# Patient Record
Sex: FEMALE | Race: WHITE | HISPANIC OR LATINO | Employment: STUDENT | URBAN - METROPOLITAN AREA
[De-identification: names, ages, dates, MRNs, and addresses within clinical notes are randomized per-mention and may not be internally consistent; named-entity substitution may affect disease eponyms.]

---
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
## 2023-01-22 ENCOUNTER — OFFICE VISIT (OUTPATIENT)
Dept: URGENT CARE | Facility: URGENT CARE | Age: 18
End: 2023-01-22
Payer: COMMERCIAL

## 2023-01-22 VITALS
HEIGHT: 66 IN | HEART RATE: 116 BPM | BODY MASS INDEX: 35.9 KG/M2 | OXYGEN SATURATION: 99 % | RESPIRATION RATE: 16 BRPM | WEIGHT: 223.4 LBS | TEMPERATURE: 97.3 F | SYSTOLIC BLOOD PRESSURE: 152 MMHG | DIASTOLIC BLOOD PRESSURE: 88 MMHG

## 2023-01-22 DIAGNOSIS — K62.5 RECTAL BLEEDING: Primary | ICD-10-CM

## 2023-01-22 LAB
ALBUMIN SERPL-MCNC: 3.7 G/DL (ref 3.4–5)
ALP SERPL-CCNC: 79 U/L (ref 40–150)
ALT SERPL W P-5'-P-CCNC: 31 U/L (ref 0–50)
ANION GAP SERPL CALCULATED.3IONS-SCNC: 6 MMOL/L (ref 3–14)
AST SERPL W P-5'-P-CCNC: 25 U/L (ref 0–35)
BASOPHILS # BLD AUTO: 0 10E3/UL (ref 0–0.2)
BASOPHILS NFR BLD AUTO: 1 %
BILIRUB SERPL-MCNC: 0.5 MG/DL (ref 0.2–1.3)
BUN SERPL-MCNC: 12 MG/DL (ref 7–30)
CALCIUM SERPL-MCNC: 9.8 MG/DL (ref 8.5–10.1)
CHLORIDE BLD-SCNC: 104 MMOL/L (ref 96–110)
CO2 SERPL-SCNC: 30 MMOL/L (ref 20–32)
CREAT SERPL-MCNC: 0.6 MG/DL (ref 0.5–1)
EOSINOPHIL # BLD AUTO: 0.2 10E3/UL (ref 0–0.7)
EOSINOPHIL NFR BLD AUTO: 3 %
ERYTHROCYTE [DISTWIDTH] IN BLOOD BY AUTOMATED COUNT: 11.8 % (ref 10–15)
GFR SERPL CREATININE-BSD FRML MDRD: >90 ML/MIN/1.73M2
GLUCOSE BLD-MCNC: 123 MG/DL (ref 70–99)
HCT VFR BLD AUTO: 41.1 % (ref 35–47)
HGB BLD-MCNC: 13.6 G/DL (ref 11.7–15.7)
LYMPHOCYTES # BLD AUTO: 1.9 10E3/UL (ref 0.8–5.3)
LYMPHOCYTES NFR BLD AUTO: 28 %
MCH RBC QN AUTO: 30.6 PG (ref 26.5–33)
MCHC RBC AUTO-ENTMCNC: 33.1 G/DL (ref 31.5–36.5)
MCV RBC AUTO: 92 FL (ref 78–100)
MONOCYTES # BLD AUTO: 0.5 10E3/UL (ref 0–1.3)
MONOCYTES NFR BLD AUTO: 7 %
NEUTROPHILS # BLD AUTO: 4.2 10E3/UL (ref 1.6–8.3)
NEUTROPHILS NFR BLD AUTO: 62 %
PLATELET # BLD AUTO: 421 10E3/UL (ref 150–450)
POTASSIUM BLD-SCNC: 4.5 MMOL/L (ref 3.4–5.3)
PROT SERPL-MCNC: 7.9 G/DL (ref 6.8–8.8)
RBC # BLD AUTO: 4.45 10E6/UL (ref 3.8–5.2)
SODIUM SERPL-SCNC: 140 MMOL/L (ref 133–144)
WBC # BLD AUTO: 6.8 10E3/UL (ref 4–11)

## 2023-01-22 PROCEDURE — 85025 COMPLETE CBC W/AUTO DIFF WBC: CPT | Performed by: FAMILY MEDICINE

## 2023-01-22 PROCEDURE — 36415 COLL VENOUS BLD VENIPUNCTURE: CPT | Performed by: FAMILY MEDICINE

## 2023-01-22 PROCEDURE — 80053 COMPREHEN METABOLIC PANEL: CPT | Performed by: FAMILY MEDICINE

## 2023-01-22 PROCEDURE — 99213 OFFICE O/P EST LOW 20 MIN: CPT | Performed by: FAMILY MEDICINE

## 2023-01-22 ASSESSMENT — PAIN SCALES - GENERAL: PAINLEVEL: NO PAIN (0)

## 2023-01-22 NOTE — PROGRESS NOTES
"SUBJECTIVE:  Chief Complaint   Patient presents with     Urgent Care     Rectal bleeding      Candie Chavez is a 17 year old female who presents with a chief complaint of rectal bleeding.    Noted bright red blood in toilet after having BM this was about 1 week ago.  Did notice some blood when wiped and did sting a little.  Symptoms did improve but then this morning, noted red blood in toilet with BM again.  No dysuria.  Just started menses so has mild abdominal cramp.    Overall has been doing well with no fatigue, fever.    Has strong family history of colon cancer - m. Uncle diagnosed at age 43, great uncle at 60    Past Medical History:   Diagnosis Date     Depressive disorder      Hypertension      Current Outpatient Medications   Medication Sig Dispense Refill     droxidopa (NORTHERA) 100 MG capsule Take 100 mg by mouth 3 times daily       escitalopram (LEXAPRO) 10 MG tablet TAKE 1 TABLET(10 MG) BY MOUTH DAILY 30 tablet 0     escitalopram (LEXAPRO) 20 MG tablet TAKE 1 TABLET(20 MG) BY MOUTH DAILY 30 tablet 1     Social History     Tobacco Use     Smoking status: Never     Smokeless tobacco: Never   Substance Use Topics     Alcohol use: Never       ROS:  Review of systems negative except as stated above.    EXAM:   BP (!) 152/88   Pulse 116   Temp 97.3  F (36.3  C) (Tympanic)   Resp 16   Ht 1.676 m (5' 6\")   Wt 101.3 kg (223 lb 6.4 oz)   SpO2 99%   BMI 36.06 kg/m    GENERAL APPEARANCE: healthy, alert and no distress  PSYCH:alert, affect bright    Results for orders placed or performed in visit on 01/22/23   Comprehensive metabolic panel (BMP + Alb, Alk Phos, ALT, AST, Total. Bili, TP)     Status: Abnormal   Result Value Ref Range    Sodium 140 133 - 144 mmol/L    Potassium 4.5 3.4 - 5.3 mmol/L    Chloride 104 96 - 110 mmol/L    Carbon Dioxide (CO2) 30 20 - 32 mmol/L    Anion Gap 6 3 - 14 mmol/L    Urea Nitrogen 12 7 - 30 mg/dL    Creatinine 0.60 0.50 - 1.00 mg/dL    Calcium 9.8 8.5 - 10.1 mg/dL    " Glucose 123 (H) 70 - 99 mg/dL    Alkaline Phosphatase 79 40 - 150 U/L    AST 25 0 - 35 U/L    ALT 31 0 - 50 U/L    Protein Total 7.9 6.8 - 8.8 g/dL    Albumin 3.7 3.4 - 5.0 g/dL    Bilirubin Total 0.5 0.2 - 1.3 mg/dL    GFR Estimate >90 >60 mL/min/1.73m2   CBC with platelets and differential     Status: None   Result Value Ref Range    WBC Count 6.8 4.0 - 11.0 10e3/uL    RBC Count 4.45 3.80 - 5.20 10e6/uL    Hemoglobin 13.6 11.7 - 15.7 g/dL    Hematocrit 41.1 35.0 - 47.0 %    MCV 92 78 - 100 fL    MCH 30.6 26.5 - 33.0 pg    MCHC 33.1 31.5 - 36.5 g/dL    RDW 11.8 10.0 - 15.0 %    Platelet Count 421 150 - 450 10e3/uL    % Neutrophils 62 %    % Lymphocytes 28 %    % Monocytes 7 %    % Eosinophils 3 %    % Basophils 1 %    Absolute Neutrophils 4.2 1.6 - 8.3 10e3/uL    Absolute Lymphocytes 1.9 0.8 - 5.3 10e3/uL    Absolute Monocytes 0.5 0.0 - 1.3 10e3/uL    Absolute Eosinophils 0.2 0.0 - 0.7 10e3/uL    Absolute Basophils 0.0 0.0 - 0.2 10e3/uL   CBC with platelets and differential     Status: None    Narrative    The following orders were created for panel order CBC with platelets and differential.  Procedure                               Abnormality         Status                     ---------                               -----------         ------                     CBC with platelets and d...[[id number]]                      Final result                 Please view results for these tests on the individual orders.       ASSESSMENT/PLAN:  (K62.5) Rectal bleeding  (primary encounter diagnosis)  Plan: CBC with platelets and differential,         Comprehensive metabolic panel (BMP + Alb, Alk         Phos, ALT, AST, Total. Bili, TP)            Reviewed that due to family history of colon cancer that further evaluation with colonoscopy most likely needed with blood in stool.  Recommend for patient to contact her primary provider to facilitate referral for GI work up.    Follow up with primary provider for concerns for blood  in stool    Fred Mcneill MD  January 22, 2023 2:17 PM

## 2023-01-23 ENCOUNTER — E-VISIT (OUTPATIENT)
Dept: PEDIATRICS | Facility: CLINIC | Age: 18
End: 2023-01-23
Payer: COMMERCIAL

## 2023-01-23 DIAGNOSIS — K92.1 BLOOD IN STOOL: ICD-10-CM

## 2023-01-23 DIAGNOSIS — K64.4 EXTERNAL HEMORRHOIDS: Primary | ICD-10-CM

## 2023-01-23 PROCEDURE — 99207 PR NON-BILLABLE SERV PER CHARTING: CPT | Performed by: INTERNAL MEDICINE

## 2023-02-05 DIAGNOSIS — F32.A DEPRESSION, UNSPECIFIED DEPRESSION TYPE: ICD-10-CM

## 2023-02-06 RX ORDER — ESCITALOPRAM OXALATE 20 MG/1
20 TABLET ORAL DAILY
Qty: 60 TABLET | Refills: 0 | Status: SHIPPED | OUTPATIENT
Start: 2023-02-06

## 2023-02-08 RX ORDER — ESCITALOPRAM OXALATE 10 MG/1
TABLET ORAL
Qty: 30 TABLET | Refills: 0 | Status: SHIPPED | OUTPATIENT
Start: 2023-02-08

## 2023-02-08 NOTE — TELEPHONE ENCOUNTER
Routing refill request to provider for review/approval because:  PHQ9: 12 on 7/2022    Rafael Bates RN

## 2023-02-23 ENCOUNTER — VIRTUAL VISIT (OUTPATIENT)
Dept: GASTROENTEROLOGY | Facility: CLINIC | Age: 18
End: 2023-02-23
Attending: INTERNAL MEDICINE
Payer: COMMERCIAL

## 2023-02-23 DIAGNOSIS — K92.1 BLOOD IN STOOL: ICD-10-CM

## 2023-02-23 DIAGNOSIS — K59.00 CONSTIPATION, UNSPECIFIED CONSTIPATION TYPE: Primary | ICD-10-CM

## 2023-02-23 PROCEDURE — 99204 OFFICE O/P NEW MOD 45 MIN: CPT | Mod: VID | Performed by: NURSE PRACTITIONER

## 2023-02-23 NOTE — PATIENT INSTRUCTIONS
It was a pleasure taking care of you today.  I've included a brief summary of our discussion and care plan from today's visit below.  Please review this information with your primary care provider.  ______________________________________________________________________    My recommendations are summarized as follows:    As we discussed today, will proceed with flexible sigmoidoscopy (flex sig) study to evaluate for source of your rectal bleeding. They should call you within the next 1-2 weeks to schedule an appointment.    2. Please see some recommendations on constipation prevention below.    3. Avoid spicy foods, alcohol, and caffeine. Avoid prolong sitting and heavy lifting.    Return to GI Clinic as needed   ______________________________________________________________________    Constipation refers to a change in bowel habits, but it has varied meanings. Stools may be too hard or too small, difficult to pass, or infrequent (less than three times per week). People with constipation may also notice a frequent need to strain and a sense that the bowels are not empty.  Constipation is a very common problem. Each year more than 2.5 million Americans visit their healthcare provider for relief from this problem. Many factors can contribute to or cause constipation, although in most people, no single cause can be found. In general, constipation occurs more frequently as you get older.     Treatment for constipation includes changing some behaviors, eating foods high in fiber, and using medications if needed.  Behavior changes -- The bowels are most active following meals, and this is often the time when stools will pass most readily. If you ignore your body's signals to have a bowel movement, the signals become weaker and weaker over time.By paying close attention to these signals, you may have an easier time moving your bowels. Drinking a caffeine-containing beverage in the morning may also be helpful.  Increase  fiber -- Increasing fiber in your diet may reduce or eliminate constipation. The recommended amount of dietary fiber is 20 to 35 grams of fiber per day. By reading the product information panel on the side of the package, you can determine the number of grams of fiber per serving .Many fruits and vegetables can be particularly helpful in preventing and treating constipation .This is especially true of citrus fruits, prunes, and prune juice. Some breakfast cereals are also an excellent source of dietary fiber.  Start your day off with a high-fiber breakfast such as whole grain oatmeal sprinkled with  raisins, blueberries, pecans or macadamia nuts.   Slice up raw veggies (carrots, celery, bell peppers) and fruit (apples, pears) and keep  them in baggies for quick high-fiber snacks.   Munch on a small handful of nuts, such as peanuts, walnuts, and almonds.   Look for individually wrapped prunes in the grocery store for a quick fiber snack.   When buying frozen veggies, look for ones that have been  flash frozen.    Add half a cup of garbanzo or black beans or peas to your salad to add fiber, texture, and flavor.   EXAMPLES OF HIGH-FIBER FOODS   1 large apple or pear (5g)   1 cup Raisin Bran (5g)     cup raspberries (9g)   1 cup cooked broccoli (5g)   23 almonds (3.5g)   1 cup black beans (15g)   2 brazil nuts (2.5g)   1 cup peas (16g)    Anti-constipation fruit paste:  Ingredients  1 cup pitted prunes  1 cup raisins  1 cup pitted dates  1/2 cup orange juice  2/3 cup prune juice  Tip:  For even more fiber add 1 cup of natural wheat bran to the fruit mixture.  Directions (Makes 25 servings)  Combine all ingredients in a bowl and soak overnight in the refrigerator. Blend in  or  until smooth. Keep in the refrigerator (can be kept frozen in small containers).   Serving size: 2 tablespoons. Spread it on toast or eat from a spoon.  Caution: May cause bloating and abdominal cramping. Start with smaller  portion and increase it gradually over a few weeks as tolerate.    Medications:  Bulk forming -- These include natural fiber and commercial fiber preparations such as Psyllium (Konsyl; Metamucil; Perdiem), Methylcellulose (Citrucel),  or Wheat dextrin (Benefiber);  You should increase the dose of fiber supplements slowly to prevent gas and cramping, and you should always take the supplement with plenty of fluid.  2. Hyperosmolar medications  -- such as   ?Polyethylene glycol (MiraLAX, GlycoLax)  ? Lactulose  ? Sorbitol  Polyethylene glycol is generally preferred since it does not cause gas or bloating and is available without a prescription. Lactulose and sorbitol can produce gas and bloating. Sorbitol works as well as lactulose and is much less expensive.  3.Saline laxatives -- such as Milk of Magnesia and magnesium citrate (Evac-Q-Mag) act similarly to the hyperosmolar drugs.  4. Stimulant drugs -- include Senna (eg, Black Draught, Ex-Lax, Senokot) and Bisacodil (eg, Correctol, Doxidan, Dulcolax).     Constipation treatments to avoid:  ? Emollients - Emollient laxatives, principally mineral oil, soften stools by moisturizing them. However, other treatments have fewer risks and equal benefit.  ? Natural products - A wide variety of natural products are advertised for constipation. Some of them contain the active ingredients found in commercially available laxatives. However, their dose and purity may not be carefully controlled. Thus, these products are not generally recommended.  ? A variety of home-made enema preparations have been used throughout the years, such as soapsuds, hydrogen peroxide, and household detergents. These can be extremely irritating to the lining of the intestine and should be avoided.           ______________________________________________________________________    Who do I call with any questions after my visit?  Please be in touch if there are any further questions that arise following  today's visit.  There are multiple ways to contact your gastroenterology care team.      During business hours, you may reach a Gastroenterology nurse at [phone], option 3.     To schedule or reschedule an appointment, please call [phone].   To schedule your imaging studies (CT, MRI, ultrasound)  call [phone] (or toll-free # [phone])  To schedule your lab work at [facility], please call [phone]    You can always send a secure message through Anaphore.  Anaphore messages are answered by your nurse or doctor typically within 24 hours.  Please allow extra time on weekends and holidays.      For urgent/emergent questions after business hours, you may reach the on-call GI Fellow by contacting the [facility]  at [phone].    In order for your refill to be processed in a timely fashion, it is your responsibility to ensure you follow the recommendations from your provider regarding your laboratory studies and follow up appointments.       How will I get the results of any tests ordered?    You will receive all of your results.  If you have signed up for [employer], any tests ordered at your visit will be available to you after your physician reviews them.  Typically this takes 1-2 weeks.  If there are urgent results that require a change in your care plan, your physician or nurse will call you to discuss the next steps.   What is Anaphore?  Anaphore is a secure way for you to access all of your healthcare records from the [facility].  It is a web based computer program, so you can sign on to it from any location.  It also allows you to send secure messages to your care team.  I recommend signing up for Anaphore access if you have not already done so and are comfortable with using a computer.    How to I schedule a follow-up visit?  If you did not schedule a follow-up visit today, please call [phone] to schedule a follow-up office visit.       Sincerely,  SADI Bhatt,  [facility],  Division of Gastroenterology   ([facility])

## 2023-02-23 NOTE — PROGRESS NOTES
"Candie is a 17 year old who is being evaluated via a billable video visit.      How would you like to obtain your AVS? MyChart  If the video visit is dropped, the invitation should be resent by: Text to cell phone: [phone]  Will anyone else be joining your video visit? No    Video-Visit Details  Type of service:  Video Visit   Video Started: 0845   Video Ended: 0905  Originating Location (pt. Location): Home  Distant Location (provider location):  Off-site  Platform used for Video Visit: [facility]    Gastroenterology CLINIC VISIT, NEW PATIENT    CC/REFERRING PROVIDER: Raymundo De La Rosa  REASON FOR CONSULTATION:  Red blood per rectum    HPI: 17 year old female presenting to GI clinic for evaluation of a new symptom of blood per rectum since approximately one month ago. Patient reported seeing moderate amount of red blood in the toilet and on wipes after defecation, but it \"doesn't happen every day\". Reported some lower abdominal discomfort before and during defecation. Said that her stools consistency is \"switching\" from day to day, but lately she has been having hard stools. Has BP every day.  Cannot name any preceding or contributing events. Denies NSAIDs or alcohol use. No changes in diet. Not taking any OTC supplements. Denies using laxatives. No history of intercourse with anal penetration.   No other symptoms such as nausea, vomiting, acid reflux, changes in appetite, weight loss, fatigue, dizziness or lightheadedness.  Family history of colon cancer reported: maternal uncle and maternal great grandmother.      ROS: 10pt ROS performed and otherwise negative.    PAST MEDICAL HISTORY:  Past Medical History:   Diagnosis Date     Depressive disorder      Hypertension        PREVIOUS ABDOMINAL/GYNECOLOGIC SURGERIES:    No past surgical history on file.      PERTINENT MEDICATIONS:  Current Outpatient Medications   Medication Sig Dispense Refill     escitalopram (LEXAPRO) 10 MG tablet TAKE 1 TABLET(10 MG) BY MOUTH " DAILY 30 tablet 0     escitalopram (LEXAPRO) 20 MG tablet Take 1 tablet (20 mg) by mouth daily Needs appointment with Dr. De La Rosa before next refill. 60 tablet 0     droxidopa (NORTHERA) 100 MG capsule Take 100 mg by mouth 3 times daily (Patient not taking: Reported on 2/23/2023)         No other OTC/herbal/supplements reported by patient.    SOCIAL HISTORY:  Social History     Socioeconomic History     Marital status: Single     Spouse name: Not on file     Number of children: Not on file     Years of education: Not on file     Highest education level: Not on file   Occupational History     Not on file   Tobacco Use     Smoking status: Never     Smokeless tobacco: Never   Vaping Use     Vaping Use: Not on file   Substance and Sexual Activity     Alcohol use: Never     Drug use: Never     Sexual activity: Yes     Birth control/protection: Pill   Other Topics Concern     Not on file   Social History Narrative     Not on file     Social Determinants of Health     Financial Resource Strain: Not on file   Food Insecurity: Not on file   Transportation Needs: Not on file   Physical Activity: Not on file   Stress: Not on file   Social Connections: Not on file   Intimate Partner Violence: Not on file   Housing Stability: Not on file       FAMILY HISTORY:  Denies colon/panc/esophageal/other GI CA, no other Fu or other HPS-related Marty. No IBD/celiac, no other AI/liver/thyroid disease.    Family History   Problem Relation Age of Onset     Depression Father      Attention Deficit Disorder Father      Attention Deficit Disorder Son        PHYSICAL EXAMINATION:  Vitals reviewed  There were no vitals taken for this visit.    General: Patient appears well in no acute distress.   Skin: No visualized rash or lesions on visualized skin  Eyes: EOMI, no erythema, sclera icterus or discharge noted  Resp: breathing comfortably without accessory muscle usage, speaking in full sentences, no cough  MSK: Appears to have normal range of motion  based on visualized movements  Neurologic: No apparent tremors, facial movements symmetric  Psych: affect normal, alert and oriented      PERTINENT STUDIES Reviewed in EMR    ASSESSMENT/PLAN:    17 year old female  presented  to GI clinic for evaluation of a new symptom of red blood per rectum for 3-4 weeks. Reported seeing blood on wipes after defecation and sometimes, pink discoloration of water in the toilet after having bowel movement. No rectal or anal pain, but reported mild abdominal cramping before and during defecation.  Denies any red flag symptoms. No signs of anemia or thrombocytopenia on recent lab work. Normal liver enzymes and bilirubin. No immediate family history of colon cancer or IBD.  We talked about possible causes of red blood per rectum such as anal irritation or fissure, hemorrhoids, proctitis, or colitis. Recommended to proceed with flex sig.  Suggested to work on constipation prevention (see AVS) . Increase fiber intake. Maintain proper hydration. Take Miralax as needed.       ICD-10-CM    1. Constipation, unspecified constipation type  K59.00       2. Blood in stool  K92.1 Adult GI  Referral - Consult Only     Adult GI  Referral - Procedure Only          Patient verbalized understanding and appreciation of care provided. Stated that all of the questions were answered to her/his satisfaction.    RTC as needed    Thank you for this consultation. It was a pleasure to participate in the care of this patient; please contact us with any further questions.    RHONDA Bhatt, FNP-C  [facility]  Gastroenterology Department  [place], [place]    This note was created with Dragon voice recognition software, and while reviewed for accuracy, inadvertent minor typographic errors may occur. Please contact the provider if you have any questions.

## 2023-03-02 ENCOUNTER — TELEPHONE (OUTPATIENT)
Dept: GASTROENTEROLOGY | Facility: CLINIC | Age: 18
End: 2023-03-02
Payer: COMMERCIAL

## 2023-03-02 NOTE — TELEPHONE ENCOUNTER
Screening Questions  BLUE  KIND OF PREP RED  LOCATION [review exclusion criteria] GREEN  SEDATION TYPE        Y Are you active on mychart?       Kimberley Fuentes, RHONDA CNP  Ordering/Referring Provider?        [facility] What type of coverage do you have?      N Have you had a positive covid test in the last 14 days?     37.6 1. BMI  [BMI 40+ - review exclusion criteria]    Y  2. Are you able to give consent for your medical care? [IF NO,RN REVIEW]          N  3. Are you taking any prescription pain medications on a routine schedule   (ex narcotics: oxycodone, roxicodone, oxycontin,  and percocet)? [RN Review]          3a. EXTENDED PREP What kind of prescription?     N 4. Do you have any chemical dependencies such as alcohol, street drugs, or methadone?        **If yes 3- 5 , please schedule with MAC sedation.**          IF YES TO ANY 6 - 10 - HOSPITAL SETTING ONLY.     N 6.   Do you need assistance transferring?     N 7.   Have you had a heart or lung transplant?    N 8.   Are you currently on dialysis?   N 9.   Do you use daily home oxygen?   N 10. Do you take nitroglycerin?   10a.  If yes, how often?     11. [FEMALES]  N Are you currently pregnant?    11a.  If yes, how many weeks? [ Greater than 12 weeks, OR NEEDED]    N 12. Do you have Pulmonary Hypertension? *NEED PAC APPT AT [facility] w/ MAC*     N 13. [review exclusion criteria]  Do you have any implantable devices in your body (pacemaker, defib, LVAD)?    N 14. In the past 6 months, have you had any heart related issues including cardiomyopathy or heart attack?     14a.  If yes, did it require cardiac stenting if so when?     N 15. Have you had a stroke or Transient ischemic attack (TIA - aka  mini stroke ) within 6 months?      N 16. Do you have mod to severe Obstructive Sleep Apnea?  [Hospital only]    N 17. Do you have SEVERE AND UNCONTROLLED asthma? *NEED PAC APPT AT [facility] w/MAC*     18. Are you currently taking any blood thinners?     18a. No.  "Continue to 19.   18b. Yes/no Blood Thinner: No [CONTINUE TO #19]    N 19. Do you take the medication Phentermine?    19a. If yes, \"Hold for 7 days before procedure.  Please consult your prescribing provider if you have questions about holding this medication.\"     N  20. Do you have chronic kidney disease?      N  21. Do you have a diagnosis of diabetes?     N  22. On a regular basis do you go 3-5 days between bowel movements?      23. Preferred LOCAL Pharmacy for Pre Prescription    [ LIST ONLY ONE PHARMACY]       [facility] #[id number] - [place], [place] - [address] AT [facility]        - CLOSING REMINDERS -    Informed patient they will need an adult    Cannot take any type of public or medical transportation alone    Conscious Sedation- Needs  for 6 hours after the procedure       MAC/General-Needs  for 24 hours after procedure    Pre-Procedure Covid test to be completed [[facility] PCR Testing Required]    Confirmed Nurse will call to complete assessment       - SCHEDULING DETAILS -  N Hospital Setting Required? If yes, what is the exclusion?:    [place]  Surgeon    03/28/2023  Date of Procedure  FLEXIBLE SIGMOIDOSCOPY [Flex Sig]  Type of Procedure Scheduled  [facility]   STANDARD GOLYTELY-If you answer yes to questions #8, #20, #21Which Colonoscopy Prep was Sent?     CS Sedation Type     N PAC / Pre-op Required               "

## 2023-03-10 ENCOUNTER — TELEPHONE (OUTPATIENT)
Dept: GASTROENTEROLOGY | Facility: CLINIC | Age: 18
End: 2023-03-10

## 2023-03-10 NOTE — TELEPHONE ENCOUNTER
Pt returning call    Pre assessment questions completed for upcoming Flexible sigmoidoscopy  procedure scheduled on 3/28/23    COVID policy reviewed.     Reviewed procedural arrival time 1115, procedure time 1200 and facility location [facility]; [address] [place] [address]    Designated  policy reviewed. Instructed to have someone stay 6 hours post procedure.     Reviewed procedure prep instructions.     Patient verbalized understanding and had no questions or concerns at this time.    Mariia Casillas RN  Endoscopy Procedure Pre Assessment RN

## 2023-03-10 NOTE — TELEPHONE ENCOUNTER
Attempted to contact patient regarding upcoming Flexible sigmoidoscopy  procedure on 3/28/23 for pre assessment questions. No answer.     Left message to return call to [phone] #[phone]    Discuss Covid policy and designated  policy.    Pre op exam? N/A    Arrival time: 1115. Procedure time: 1200    Facility location: [facility]; [facility] [place] [address]    Sedation type: Conscious sedation     Anticoagulants: No    Electronic implanted devices? No    Diabetic? No    Indication for procedure: Blood in stool, red blood per rectum X 1 month, lower abd.cramping    Bowel prep recommendation: 2 [employer]     Prep instructions previously sent via [place] by scheduling team.       Rebecca Altamirano RN  Endoscopy Procedure Pre Assessment RN

## 2023-03-28 ENCOUNTER — HOSPITAL ENCOUNTER (OUTPATIENT)
Facility: CLINIC | Age: 18
Discharge: HOME | End: 2023-03-28
Attending: INTERNAL MEDICINE | Admitting: INTERNAL MEDICINE
Payer: COMMERCIAL

## 2023-03-28 VITALS
RESPIRATION RATE: 18 BRPM | HEART RATE: 98 BPM | SYSTOLIC BLOOD PRESSURE: 142 MMHG | DIASTOLIC BLOOD PRESSURE: 80 MMHG | OXYGEN SATURATION: 98 %

## 2023-03-28 DIAGNOSIS — Z80.0 FH: COLON CANCER: ICD-10-CM

## 2023-03-28 DIAGNOSIS — K62.5 RECTAL BLEEDING: Primary | ICD-10-CM

## 2023-03-28 PROCEDURE — 999N000002 HC CANCELLED SURGERY UP TO 16-30 MINS: Performed by: INTERNAL MEDICINE

## 2023-03-28 RX ORDER — BUPROPION HYDROCHLORIDE 300 MG/1
300 TABLET ORAL EVERY MORNING
COMMUNITY

## 2023-03-28 RX ORDER — NORETHINDRONE ACETATE AND ETHINYL ESTRADIOL AND FERROUS FUMARATE 5-7-9-7
1 KIT ORAL DAILY
COMMUNITY

## 2023-03-28 ASSESSMENT — ACTIVITIES OF DAILY LIVING (ADL): ADLS_ACUITY_SCORE: 35

## 2023-03-28 NOTE — PROGRESS NOTES
GI & Hepatology Staff    Ms. Chavez has noted numerous episode of BRBPR - blood in the toilet water and paper; red blood coating the stool.    She was scheduled for a Flex Sig today. However, given the strong FH of colon cancer and polyps, I think it is best to do a colonoscopy. I discussed this with the patient (and her mother), and she is agreeable. We will try to add her on to the (full) schedule in the near future.

## 2023-04-04 ENCOUNTER — HOSPITAL ENCOUNTER (OUTPATIENT)
Facility: CLINIC | Age: 18
Discharge: HOME | End: 2023-04-04
Attending: INTERNAL MEDICINE | Admitting: INTERNAL MEDICINE
Payer: COMMERCIAL

## 2023-04-04 VITALS
SYSTOLIC BLOOD PRESSURE: 142 MMHG | BODY MASS INDEX: 35.84 KG/M2 | DIASTOLIC BLOOD PRESSURE: 93 MMHG | WEIGHT: 223 LBS | HEART RATE: 100 BPM | OXYGEN SATURATION: 100 % | RESPIRATION RATE: 27 BRPM | HEIGHT: 66 IN

## 2023-04-04 LAB — COLONOSCOPY: NORMAL

## 2023-04-04 PROCEDURE — 250N000011 HC RX IP 250 OP 636: Performed by: INTERNAL MEDICINE

## 2023-04-04 PROCEDURE — 258N000003 HC RX IP 258 OP 636: Performed by: INTERNAL MEDICINE

## 2023-04-04 PROCEDURE — 45378 DIAGNOSTIC COLONOSCOPY: CPT | Performed by: INTERNAL MEDICINE

## 2023-04-04 PROCEDURE — G0500 MOD SEDAT ENDO SERVICE >5YRS: HCPCS | Performed by: INTERNAL MEDICINE

## 2023-04-04 RX ORDER — FENTANYL CITRATE 50 UG/ML
INJECTION, SOLUTION INTRAMUSCULAR; INTRAVENOUS PRN
Status: DISCONTINUED | OUTPATIENT
Start: 2023-04-04 | End: 2023-04-04 | Stop reason: HOSPADM

## 2023-04-04 RX ORDER — DIPHENHYDRAMINE HYDROCHLORIDE 50 MG/ML
INJECTION INTRAMUSCULAR; INTRAVENOUS PRN
Status: DISCONTINUED | OUTPATIENT
Start: 2023-04-04 | End: 2023-04-04 | Stop reason: HOSPADM

## 2023-04-04 RX ORDER — SODIUM CHLORIDE 9 MG/ML
INJECTION, SOLUTION INTRAVENOUS CONTINUOUS PRN
Status: DISCONTINUED | OUTPATIENT
Start: 2023-04-04 | End: 2023-04-04 | Stop reason: HOSPADM

## 2023-04-04 ASSESSMENT — ACTIVITIES OF DAILY LIVING (ADL): ADLS_ACUITY_SCORE: 35

## 2023-06-02 ENCOUNTER — HEALTH MAINTENANCE LETTER (OUTPATIENT)
Age: 18
End: 2023-06-02

## 2023-11-19 ENCOUNTER — E-VISIT (OUTPATIENT)
Dept: URGENT CARE | Facility: CLINIC | Age: 18
End: 2023-11-19
Payer: COMMERCIAL

## 2023-11-19 DIAGNOSIS — H10.31 ACUTE BACTERIAL CONJUNCTIVITIS OF RIGHT EYE: Primary | ICD-10-CM

## 2023-11-19 PROCEDURE — 99421 OL DIG E/M SVC 5-10 MIN: CPT | Performed by: FAMILY MEDICINE

## 2023-11-19 RX ORDER — POLYMYXIN B SULFATE AND TRIMETHOPRIM 1; 10000 MG/ML; [USP'U]/ML
SOLUTION OPHTHALMIC
Qty: 10 ML | Refills: 0 | Status: SHIPPED | OUTPATIENT
Start: 2023-11-19 | End: 2023-11-26

## 2023-11-19 NOTE — PATIENT INSTRUCTIONS
Thank you for choosing us for your care. I have placed an order for a prescription so that you can start treatment. View your full visit summary for details by clicking on the link below. Your pharmacist will able to address any questions you may have about the medication.     If you re not feeling better within 2-3 days, please schedule an appointment.  You can schedule an appointment right here in [facility], or call [phone]  If the visit is for the same symptoms as your eVisit, we ll refund the cost of your eVisit if seen within seven days.

## 2024-02-28 ENCOUNTER — VIRTUAL VISIT (OUTPATIENT)
Dept: PEDIATRICS | Facility: CLINIC | Age: 19
End: 2024-02-28
Payer: COMMERCIAL

## 2024-02-28 ENCOUNTER — MYC MEDICAL ADVICE (OUTPATIENT)
Dept: PEDIATRICS | Facility: CLINIC | Age: 19
End: 2024-02-28

## 2024-02-28 DIAGNOSIS — L60.1 ONYCHOLYSIS: Primary | ICD-10-CM

## 2024-02-28 PROCEDURE — 99213 OFFICE O/P EST LOW 20 MIN: CPT | Mod: 95 | Performed by: PEDIATRICS

## 2024-02-28 RX ORDER — DEXTROAMPHETAMINE SACCHARATE, AMPHETAMINE ASPARTATE MONOHYDRATE, DEXTROAMPHETAMINE SULFATE AND AMPHETAMINE SULFATE 5; 5; 5; 5 MG/1; MG/1; MG/1; MG/1
CAPSULE, EXTENDED RELEASE ORAL
COMMUNITY
Start: 2024-01-26

## 2024-02-28 NOTE — PATIENT INSTRUCTIONS
General nail care measures for onycholysis include:    ?Keeping nails trimmed short    ?Avoiding trauma    ?Avoiding contact irritants    ?Keeping nails dry (avoiding wet work)    ?Avoiding all nail cosmetics    ?Protecting hands from cold or windy weather  ==================================================    Dermatology Consults   Dr. Tad Sanchez  [phone]

## 2024-02-28 NOTE — PROGRESS NOTES
"Candie is a 18 year old who is being evaluated via a billable video visit.      How would you like to obtain your AVS? MyChart  If the video visit is dropped, the invitation should be resent by: Text to cell phone: [phone]  Will anyone else be joining your video visit? No          Assessment & Plan     Onycholysis  Patient Instructions   General nail care measures for onycholysis include:    ?Keeping nails trimmed short    ?Avoiding trauma    ?Avoiding contact irritants    ?Keeping nails dry (avoiding wet work)    ?Avoiding all nail cosmetics    ?Protecting hands from cold or windy weather  ==================================================    Dermatology Consults   Dr. Tad Sanchez  [phone]   OR  - Peds Dermatology  Referral; Future            BMI  Estimated body mass index is 35.99 kg/m  as calculated from the following:    Height as of 4/4/23: 5' 6\" (1.676 m).    Weight as of 4/4/23: 223 lb (101.2 kg).             Subjective   Candie is a 18 year old, presenting for the following health issues:  No chief complaint on file.    History of Present Illness       Reason for visit:  Lifted nail from nail bed  Symptom onset:  More than a month  Symptoms include:  Large defined white section of nail  Symptom intensity:  Moderate  Symptom progression:  Worsening  Had these symptoms before:  Yes  Has tried/received treatment for these symptoms:  No    She eats 2-3 servings of fruits and vegetables daily.She consumes 1 sweetened beverage(s) daily.She exercises with enough effort to increase her heart rate 30 to 60 minutes per day.  She exercises with enough effort to increase her heart rate 4 days per week.   She is taking medications regularly.       Nail separation of right middle finger noted for a couple of years.  It will separate, grow out re-attached, and then separate again.  Now her pointer finger on the right hand and thumb on the left hand are also affected.  No known trigger, but she is " worried that she may have psoriasis.  There is a family history of psoriasis, and she has nail pitting, skin rashes, and scalp flaking that may be contributing.  NO fevers, no weight loss.      Objective           Vitals:  No vitals were obtained today due to virtual visit.    Physical Exam   GENERAL: alert and no distress  EYES: Eyes grossly normal to inspection.  No discharge or erythema, or obvious scleral/conjunctival abnormalities.  RESP: No audible wheeze, cough, or visible cyanosis.    SKIN: Visible skin clear. No significant rash, abnormal pigmentation or lesions.  NEURO: Cranial nerves grossly intact.  Mentation and speech appropriate for age.  PSYCH: Appropriate affect, tone, and pace of words    See photo in MyChart encounter for today of nail.  distal or distal-lateral separation of the nail plate from the underlying nail bed           Video-Visit Details    Type of service:  Video Visit   Video start time: 11:26 AM  Video end time: 11:37 AM  Originating Location (pt. Location): Home    Distant Location (provider location):  On-site  Platform used for Video Visit: [employer]  Signed Electronically by: Lizeth Roblero MD

## 2024-03-25 DIAGNOSIS — L60.1 ONYCHOLYSIS: Primary | ICD-10-CM

## 2024-03-25 NOTE — PROGRESS NOTES
Adult dermatology (rather than pediatric) referral sent.    Electronically signed by:  Lizeth Roblero MD  Pediatrics  [facility]

## 2024-03-27 NOTE — OR NURSING
Let her know that I did send in the Wellbutrin.  Please schedule her for a virtual visit with me in about 1 month, thank   Procedure cancelled in preop per MD Mcintosh, pt and mother in understanding.

## 2024-06-23 ENCOUNTER — HEALTH MAINTENANCE LETTER (OUTPATIENT)
Age: 19
End: 2024-06-23

## 2024-10-25 ENCOUNTER — OFFICE VISIT (OUTPATIENT)
Dept: DERMATOLOGY | Facility: CLINIC | Age: 19
End: 2024-10-25
Payer: COMMERCIAL

## 2024-10-25 DIAGNOSIS — L21.9 DERMATITIS, SEBORRHEIC: Primary | ICD-10-CM

## 2024-10-25 DIAGNOSIS — L60.1 ONYCHOLYSIS: ICD-10-CM

## 2024-10-25 PROCEDURE — 99204 OFFICE O/P NEW MOD 45 MIN: CPT | Performed by: PHYSICIAN ASSISTANT

## 2024-10-25 RX ORDER — KETOCONAZOLE 20 MG/ML
SHAMPOO, SUSPENSION TOPICAL
Qty: 120 ML | Refills: 11 | Status: SHIPPED | OUTPATIENT
Start: 2024-10-25

## 2024-10-25 RX ORDER — CLOBETASOL PROPIONATE 0.5 MG/G
OINTMENT TOPICAL
Qty: 30 G | Refills: 4 | Status: SHIPPED | OUTPATIENT
Start: 2024-10-25

## 2024-10-25 ASSESSMENT — PAIN SCALES - GENERAL: PAINLEVEL_OUTOF10: NO PAIN (0)

## 2024-10-25 NOTE — PROGRESS NOTES
[facility] Dermatology Note  Encounter Date: Oct 25, 2024  Office Visit     Reviewed patients past medical history and pertinent chart review prior to patients visit today.     Dermatology Problem List:  # Seborrheic dermatitis vs sebopsoriasis, scalp   - ketoconazole 2% shampoo  # Nail psoriasis  - clobetasol 0.05% ointment  ____________________________________________    Assessment & Plan:     # Seborrheic dermatitis vs sebopsoriasis, scalp   Discussed that this is a recurring condition for most people and will need some maintenance even after rash has resolved. Plan as follows:  - Start ketoconazole 2% shampoo three times weekly. Advised to lather in the shower, waiting 5-10 minutes before rinsing.     # Nail psoriasis  - Start clobetasol 0.05% ointment. Apply twice daily to involved nails for 4 weeks, then apply every other day for 4 weeks, then twice weekly. We reviewed potential side effects, including skin atrophy.  - Patient denies any joint pain/ redness/ swelling. Should joint symptoms occur I recommend consult with rheumatology.     Follow up in 3 months.     All risks, benefits and alternatives were discussed with patient.  Patient is in agreement and understands the assessment and plan.  All questions were answered.  Sheyla Rojas PA-C  [facility] Dermatology  _______________________________________    CC: Derm Problem (Fingernails lifting)    HPI:  Ms. Candie Chavez is a(n) 19 year old female who presents today as a new patient for nail changes. Years ago the patient developed lifting and thickening of her right 3rd fingernail. Months ago she began developing similar lifting and thickening of the right 2nd fingernail. She denies any preceding trauma to the 2nd nail. She occasionally paints her nails at home, no manicures. She notes a history of dandruff, no history of psoriasis or lichen planus. She notes her aunt her severe psoriasis. Patient is otherwise feeling  well, without additional skin concerns.      Physical Exam:  SKIN: Focused examination of scalp, fingernails, and toenails was performed.  - Greasy yellow scale with background erythema involving the scalp.   - The right 2nd and 3rd fingernails demonstrate pitting, thickening, and distal onycholysis.      - No other lesions of concern on areas examined.     Medications:  Current Outpatient Medications   Medication Sig Dispense Refill    amphetamine-dextroamphetamine (ADDERALL XR) 20 MG 24 hr capsule       buPROPion (WELLBUTRIN XL) 300 MG 24 hr tablet Take 300 mg by mouth every morning      escitalopram (LEXAPRO) 10 MG tablet TAKE 1 TABLET(10 MG) BY MOUTH DAILY (Patient not taking: Reported on 10/25/2024) 30 tablet 0    escitalopram (LEXAPRO) 20 MG tablet Take 1 tablet (20 mg) by mouth daily Needs appointment with Dr. De La Rosa before next refill. (Patient not taking: Reported on 10/25/2024) 60 tablet 0    norethindrone-ethinyl estradiol-iron (ESTROSTEP FE) 1-20/1-30/1-35 MG-MCG tablet Take 1 tablet by mouth daily       No current facility-administered medications for this visit.      Past Medical History:   There is no problem list on file for this patient.    Past Medical History:   Diagnosis Date    Depressive disorder     Hypertension        CC Lizeth Roblero MD  [address] [place] [address] on close of this encounter.

## 2024-10-25 NOTE — LETTER
10/25/2024       RE: Candie Chavez  [address] [place] [address]     Dear Colleague,    Thank you for referring your patient, Candie Chavez, to the [facility] DERMATOLOGY [facility] [place] at [facility]. Please see a copy of my visit note below.    [facility] Dermatology Note  Encounter Date: Oct 25, 2024  Office Visit     Reviewed patients past medical history and pertinent chart review prior to patients visit today.     Dermatology Problem List:  # Seborrheic dermatitis vs sebopsoriasis, scalp   - ketoconazole 2% shampoo  # Nail psoriasis  - clobetasol 0.05% ointment  ____________________________________________    Assessment & Plan:     # Seborrheic dermatitis vs sebopsoriasis, scalp   Discussed that this is a recurring condition for most people and will need some maintenance even after rash has resolved. Plan as follows:  - Start ketoconazole 2% shampoo three times weekly. Advised to lather in the shower, waiting 5-10 minutes before rinsing.     # Nail psoriasis  - Start clobetasol 0.05% ointment. Apply twice daily to involved nails for 4 weeks, then apply every other day for 4 weeks, then twice weekly. We reviewed potential side effects, including skin atrophy.  - Patient denies any joint pain/ redness/ swelling. Should joint symptoms occur I recommend consult with rheumatology.     Follow up in 3 months.     All risks, benefits and alternatives were discussed with patient.  Patient is in agreement and understands the assessment and plan.  All questions were answered.  Sheyla Rojas PA-C  [facility] Dermatology  _______________________________________    CC: Derm Problem (Fingernails lifting)    HPI:  Ms. Candie Chavez is a(n) 19 year old female who presents today as a new patient for nail changes. Years ago the patient developed lifting and thickening of her right 3rd fingernail. Months ago she began developing  similar lifting and thickening of the right 2nd fingernail. She denies any preceding trauma to the 2nd nail. She occasionally paints her nails at home, no manicures. She notes a history of dandruff, no history of psoriasis or lichen planus. She notes her aunt her severe psoriasis. Patient is otherwise feeling well, without additional skin concerns.      Physical Exam:  SKIN: Focused examination of scalp, fingernails, and toenails was performed.  - Greasy yellow scale with background erythema involving the scalp.   - The right 2nd and 3rd fingernails demonstrate pitting, thickening, and distal onycholysis.      - No other lesions of concern on areas examined.     Medications:  Current Outpatient Medications   Medication Sig Dispense Refill     amphetamine-dextroamphetamine (ADDERALL XR) 20 MG 24 hr capsule        buPROPion (WELLBUTRIN XL) 300 MG 24 hr tablet Take 300 mg by mouth every morning       escitalopram (LEXAPRO) 10 MG tablet TAKE 1 TABLET(10 MG) BY MOUTH DAILY (Patient not taking: Reported on 10/25/2024) 30 tablet 0     escitalopram (LEXAPRO) 20 MG tablet Take 1 tablet (20 mg) by mouth daily Needs appointment with Dr. De La Rosa before next refill. (Patient not taking: Reported on 10/25/2024) 60 tablet 0     norethindrone-ethinyl estradiol-iron (ESTROSTEP FE) 1-20/1-30/1-35 MG-MCG tablet Take 1 tablet by mouth daily       No current facility-administered medications for this visit.      Past Medical History:   There is no problem list on file for this patient.    Past Medical History:   Diagnosis Date     Depressive disorder      Hypertension        CC Lizeth Roblero MD  [address] [place] [address] on close of this encounter.       Again, thank you for allowing me to participate in the care of your patient.      Sincerely,    Sheyla Rojas PA-C

## 2024-10-25 NOTE — NURSING NOTE
Dermatology Rooming Note    Candie Chavez's goals for this visit include:   Chief Complaint   Patient presents with    Derm Problem     Fingernails lifting     Karla Reyes [place]

## 2025-02-07 ENCOUNTER — ANCILLARY PROCEDURE (OUTPATIENT)
Dept: GENERAL RADIOLOGY | Facility: CLINIC | Age: 20
End: 2025-02-07
Payer: COMMERCIAL

## 2025-02-07 DIAGNOSIS — R05.2 SUBACUTE COUGH: ICD-10-CM

## 2025-02-07 DIAGNOSIS — J20.9 ACUTE BRONCHITIS WITH SYMPTOMS > 10 DAYS: ICD-10-CM

## 2025-02-07 PROCEDURE — 71046 X-RAY EXAM CHEST 2 VIEWS: CPT | Mod: TC | Performed by: RADIOLOGY

## 2025-02-25 DIAGNOSIS — J20.9 ACUTE BRONCHITIS WITH SYMPTOMS > 10 DAYS: Primary | ICD-10-CM

## 2025-02-25 RX ORDER — AZITHROMYCIN 250 MG/1
TABLET, FILM COATED ORAL
Qty: 6 TABLET | Refills: 0 | Status: SHIPPED | OUTPATIENT
Start: 2025-02-25 | End: 2025-03-02

## 2025-04-11 PROBLEM — M25.571 BILATERAL ANKLE PAIN: Status: ACTIVE | Noted: 2018-09-25

## 2025-04-11 PROBLEM — M25.572 BILATERAL ANKLE PAIN: Status: ACTIVE | Noted: 2018-09-25

## 2025-07-12 ENCOUNTER — HEALTH MAINTENANCE LETTER (OUTPATIENT)
Age: 20
End: 2025-07-12

## (undated) RX ORDER — FENTANYL CITRATE 50 UG/ML
INJECTION, SOLUTION INTRAMUSCULAR; INTRAVENOUS
Status: DISPENSED
Start: 2023-03-28

## (undated) RX ORDER — FENTANYL CITRATE 50 UG/ML
INJECTION, SOLUTION INTRAMUSCULAR; INTRAVENOUS
Status: DISPENSED
Start: 2023-04-04